# Patient Record
Sex: FEMALE | Race: WHITE | URBAN - METROPOLITAN AREA
[De-identification: names, ages, dates, MRNs, and addresses within clinical notes are randomized per-mention and may not be internally consistent; named-entity substitution may affect disease eponyms.]

---

## 2019-06-04 ENCOUNTER — Encounter (OUTPATIENT)
Dept: URBAN - METROPOLITAN AREA CLINIC 71 | Facility: CLINIC | Age: 84
End: 2019-06-04
Payer: MEDICARE

## 2019-06-04 PROCEDURE — 99213 OFFICE O/P EST LOW 20 MIN: CPT | Performed by: OPHTHALMOLOGY

## 2019-07-12 ENCOUNTER — NEW PATIENT (OUTPATIENT)
Dept: URBAN - METROPOLITAN AREA CLINIC 70 | Facility: CLINIC | Age: 84
End: 2019-07-12
Payer: MEDICARE

## 2019-07-12 DIAGNOSIS — H35.372 PUCKERING OF MACULA, LEFT EYE: ICD-10-CM

## 2019-07-12 PROCEDURE — 92134 CPTRZ OPH DX IMG PST SGM RTA: CPT | Performed by: OPHTHALMOLOGY

## 2019-07-12 PROCEDURE — 92242 FLUORESCEIN&ICG ANGIOGRAPHY: CPT | Performed by: OPHTHALMOLOGY

## 2019-07-12 PROCEDURE — 92004 COMPRE OPH EXAM NEW PT 1/>: CPT | Performed by: OPHTHALMOLOGY

## 2020-01-28 ENCOUNTER — Encounter (OUTPATIENT)
Dept: URBAN - METROPOLITAN AREA CLINIC 71 | Facility: CLINIC | Age: 85
End: 2020-01-28
Payer: MEDICARE

## 2020-01-28 PROCEDURE — 92014 COMPRE OPH EXAM EST PT 1/>: CPT | Performed by: OPHTHALMOLOGY

## 2020-10-27 ENCOUNTER — OFFICE VISIT (OUTPATIENT)
Dept: URBAN - METROPOLITAN AREA CLINIC 71 | Facility: CLINIC | Age: 85
End: 2020-10-27
Payer: MEDICARE

## 2020-10-27 DIAGNOSIS — H00.12 CHALAZION RIGHT LOWER EYELID: ICD-10-CM

## 2020-10-27 PROCEDURE — 92134 CPTRZ OPH DX IMG PST SGM RTA: CPT | Performed by: OPHTHALMOLOGY

## 2020-10-27 PROCEDURE — 92133 CPTRZD OPH DX IMG PST SGM ON: CPT | Performed by: OPHTHALMOLOGY

## 2020-10-27 PROCEDURE — 92083 EXTENDED VISUAL FIELD XM: CPT | Performed by: OPHTHALMOLOGY

## 2020-10-27 PROCEDURE — 92014 COMPRE OPH EXAM EST PT 1/>: CPT | Performed by: OPHTHALMOLOGY

## 2020-10-27 RX ORDER — TOBRAMYCIN AND DEXAMETHASONE 3; 1 MG/ML; MG/ML
SUSPENSION/ DROPS OPHTHALMIC
Qty: 1 | Refills: 0 | Status: ACTIVE
Start: 2020-10-27

## 2020-10-27 RX ORDER — AZITHROMYCIN DIHYDRATE 250 MG/1
250 MG TABLET, FILM COATED ORAL
Qty: 1 | Refills: 0 | Status: INACTIVE
Start: 2020-10-27 | End: 2020-10-31

## 2020-10-27 ASSESSMENT — INTRAOCULAR PRESSURE
OD: 13
OS: 13

## 2020-10-27 NOTE — IMPRESSION/PLAN
Impression: Chalazion right lower eyelid: H00.12. Plan: Patient to start on tobradex QID OD for 10 days. Patient to also start a z pack Also recommend warm compresses multiple times daily for 5-10 minutes. Call if worsening occurs.
Impression: Nonexudative age-related macular degeneration, bilateral, intermediate dry stage: H35.3132. Stable. Plan: Patient is being seen by retinal on an annual basis.  
Keep appointments with retinal specialist.
Impression: Open angle with borderline findings, low risk, bilateral: H40.013. VF and OCT ordered. Plan: IOP stable. Glaucoma suspect at this time due to cupping of optic nerve. Okay to keep off drops at this time.  
Patient to return in 6 months for IOP check
Impression: Presence of intraocular lens: Z96.1.  Plan: Observe
risk factors

## 2020-10-30 ENCOUNTER — FOLLOW UP ESTABLISHED (OUTPATIENT)
Dept: URBAN - METROPOLITAN AREA CLINIC 70 | Facility: CLINIC | Age: 85
End: 2020-10-30
Payer: MEDICARE

## 2020-10-30 PROCEDURE — 99214 OFFICE O/P EST MOD 30 MIN: CPT | Performed by: OPHTHALMOLOGY

## 2020-10-30 PROCEDURE — 92242 FLUORESCEIN&ICG ANGIOGRAPHY: CPT | Performed by: OPHTHALMOLOGY

## 2020-10-30 PROCEDURE — 92134 CPTRZ OPH DX IMG PST SGM RTA: CPT | Performed by: OPHTHALMOLOGY

## 2020-10-30 ASSESSMENT — INTRAOCULAR PRESSURE
OS: 12
OD: 14

## 2020-12-04 ENCOUNTER — FOLLOW UP ESTABLISHED (OUTPATIENT)
Dept: URBAN - METROPOLITAN AREA CLINIC 70 | Facility: CLINIC | Age: 85
End: 2020-12-04
Payer: MEDICARE

## 2020-12-04 DIAGNOSIS — H35.3112 NONEXUDATIVE AGE-RELATED MACULAR DEGENERATION, RIGHT EYE, INTERMEDIATE DRY STAGE: Primary | ICD-10-CM

## 2020-12-04 PROCEDURE — 92134 CPTRZ OPH DX IMG PST SGM RTA: CPT | Performed by: OPHTHALMOLOGY

## 2020-12-04 PROCEDURE — 67210 TREATMENT OF RETINAL LESION: CPT | Performed by: OPHTHALMOLOGY

## 2020-12-04 ASSESSMENT — INTRAOCULAR PRESSURE
OS: 12
OD: 12

## 2020-12-18 ENCOUNTER — FOLLOW UP ESTABLISHED (OUTPATIENT)
Dept: URBAN - METROPOLITAN AREA CLINIC 70 | Facility: CLINIC | Age: 85
End: 2020-12-18
Payer: MEDICARE

## 2020-12-18 PROCEDURE — 92134 CPTRZ OPH DX IMG PST SGM RTA: CPT | Performed by: OPHTHALMOLOGY

## 2020-12-18 PROCEDURE — 67210 TREATMENT OF RETINAL LESION: CPT | Performed by: OPHTHALMOLOGY

## 2020-12-18 ASSESSMENT — INTRAOCULAR PRESSURE
OS: 10
OD: 12

## 2021-03-19 ENCOUNTER — FOLLOW UP ESTABLISHED (OUTPATIENT)
Dept: URBAN - METROPOLITAN AREA CLINIC 70 | Facility: CLINIC | Age: 86
End: 2021-03-19
Payer: MEDICARE

## 2021-03-19 DIAGNOSIS — H35.3122 NONEXUDATIVE AGE-RELATED MACULAR DEGENERATION, LEFT EYE, INTERMEDIATE DRY STAGE: ICD-10-CM

## 2021-03-19 PROCEDURE — 92242 FLUORESCEIN&ICG ANGIOGRAPHY: CPT | Performed by: OPHTHALMOLOGY

## 2021-03-19 PROCEDURE — 92134 CPTRZ OPH DX IMG PST SGM RTA: CPT | Performed by: OPHTHALMOLOGY

## 2021-03-19 PROCEDURE — 67210 TREATMENT OF RETINAL LESION: CPT | Performed by: OPHTHALMOLOGY

## 2021-03-19 ASSESSMENT — INTRAOCULAR PRESSURE
OS: 11
OD: 13

## 2021-04-27 ENCOUNTER — OFFICE VISIT (OUTPATIENT)
Dept: URBAN - METROPOLITAN AREA CLINIC 71 | Facility: CLINIC | Age: 86
End: 2021-04-27
Payer: MEDICARE

## 2021-04-27 DIAGNOSIS — Z96.1 PRESENCE OF INTRAOCULAR LENS: ICD-10-CM

## 2021-04-27 DIAGNOSIS — H35.3132 NONEXUDATIVE AGE-RELATED MACULAR DEGENERATION, BILATERAL, INTERMEDIATE DRY STAGE: ICD-10-CM

## 2021-04-27 PROCEDURE — 99212 OFFICE O/P EST SF 10 MIN: CPT | Performed by: OPHTHALMOLOGY

## 2021-04-27 ASSESSMENT — INTRAOCULAR PRESSURE
OS: 10
OD: 14
OD: 10
OS: 14

## 2021-04-27 NOTE — IMPRESSION/PLAN
Impression: Nonexudative age-related macular degeneration, bilateral, intermediate dry stage: H35.3132. Stable. Plan: Patient is being seen by retinal specialist on a regular basis.  
Keep appointments with retinal specialist. Please confirm current basaglar dose. Will they cover or does she prefer pen or vial?

## 2021-04-27 NOTE — IMPRESSION/PLAN
Impression: Open angle with borderline findings, low risk, bilateral: H40.013. IOP stable. Glaucoma suspect at this time due to cupping of optic nerve. Okay to keep off drops at this time. Plan: Patient to return in 6 months for OCT testing and dilation. Call if any changes in vision occur.

## 2021-06-04 ENCOUNTER — OFFICE VISIT (OUTPATIENT)
Dept: URBAN - METROPOLITAN AREA CLINIC 70 | Facility: CLINIC | Age: 86
End: 2021-06-04
Payer: MEDICARE

## 2021-06-04 PROCEDURE — 92134 CPTRZ OPH DX IMG PST SGM RTA: CPT | Performed by: OPHTHALMOLOGY

## 2021-06-04 PROCEDURE — 99214 OFFICE O/P EST MOD 30 MIN: CPT | Performed by: OPHTHALMOLOGY

## 2021-06-04 ASSESSMENT — INTRAOCULAR PRESSURE
OD: 13
OS: 14

## 2021-06-04 NOTE — IMPRESSION/PLAN
Impression: Dry Macular Degeneration, OS. Plan: Exam, OCT, and ICG/FA testing show no CNVM, mild AMD. Discussed eye vitamins, AREDS-II, not needed. Reading needs glasses. Consider SDM MicroPulse laser to slow disease progression.

## 2021-06-04 NOTE — IMPRESSION/PLAN
Impression: Dry Macular Degeneration, OD.
s/p MicroPulse 03/19/21 Plan: Exam, OCT, and ICG/FA testing show no CNVM, mild AMD. Discussed eye vitamins, AREDS-II, not needed. Reading needs glasses. Consider SDM MicroPulse laser to slow disease progression.

## 2021-10-28 ENCOUNTER — OFFICE VISIT (OUTPATIENT)
Dept: URBAN - METROPOLITAN AREA CLINIC 71 | Facility: CLINIC | Age: 86
End: 2021-10-28
Payer: MEDICARE

## 2021-10-28 DIAGNOSIS — H40.013 OPEN ANGLE WITH BORDERLINE FINDINGS, LOW RISK, BILATERAL: Primary | ICD-10-CM

## 2021-10-28 PROCEDURE — 99213 OFFICE O/P EST LOW 20 MIN: CPT | Performed by: OPHTHALMOLOGY

## 2021-10-28 PROCEDURE — 92133 CPTRZD OPH DX IMG PST SGM ON: CPT | Performed by: OPHTHALMOLOGY

## 2021-10-28 ASSESSMENT — INTRAOCULAR PRESSURE
OD: 9
OS: 14
OD: 14
OS: 10

## 2021-10-28 NOTE — IMPRESSION/PLAN
Impression: Open angle with borderline findings, low risk, bilateral: H40.013. OCT ordered today and reviewed. RNFL is stable compared to previous scans. IOP  is remaining stable. Glaucoma suspect at this time due to cupping of optic nerve. Plan: Okay to keep off drops at this time. Patient to return in 6 months for VF testing and OPTOS imaging.

## 2021-10-28 NOTE — IMPRESSION/PLAN
Impression: Dry Macular Degeneration, OD.
s/p MicroPulse 03/19/21 Plan: Keep appointments as scheduled with Dr. Debbie Earl.

## 2021-10-28 NOTE — IMPRESSION/PLAN
Impression: Dry Macular Degeneration, OS. Plan: Keep appts as scheduled with Dr. Verenice Schilling.

## 2022-01-14 ENCOUNTER — OFFICE VISIT (OUTPATIENT)
Dept: URBAN - METROPOLITAN AREA CLINIC 70 | Facility: CLINIC | Age: 87
End: 2022-01-14
Payer: MEDICARE

## 2022-01-14 PROCEDURE — 99214 OFFICE O/P EST MOD 30 MIN: CPT | Performed by: OPHTHALMOLOGY

## 2022-01-14 PROCEDURE — 92242 FLUORESCEIN&ICG ANGIOGRAPHY: CPT | Performed by: OPHTHALMOLOGY

## 2022-01-14 PROCEDURE — 92134 CPTRZ OPH DX IMG PST SGM RTA: CPT | Performed by: OPHTHALMOLOGY

## 2022-01-14 ASSESSMENT — INTRAOCULAR PRESSURE
OD: 15
OS: 17

## 2022-01-14 NOTE — IMPRESSION/PLAN
Impression: Dry Macular Degeneration, OD.
s/p MicroPulse 03/19/21 Plan: Exam, OCT, and ICG/FA testing show no CNVM, mild AMD. Discussed eye vitamins, AREDS-II, not needed. Reading needs glasses. 6 Month Reeval Dil OU, OCT, RNFL, ICG, FA OD>OS.

## 2022-01-14 NOTE — IMPRESSION/PLAN
Impression: Dry Macular Degeneration, OS. Plan: Exam, OCT, and ICG/FA testing show no CNVM, mild AMD. Discussed eye vitamins, AREDS-II, not needed. Reading needs glasses.